# Patient Record
Sex: MALE | Race: BLACK OR AFRICAN AMERICAN | NOT HISPANIC OR LATINO | Employment: UNEMPLOYED | URBAN - METROPOLITAN AREA
[De-identification: names, ages, dates, MRNs, and addresses within clinical notes are randomized per-mention and may not be internally consistent; named-entity substitution may affect disease eponyms.]

---

## 2023-10-05 ENCOUNTER — HOSPITAL ENCOUNTER (EMERGENCY)
Facility: HOSPITAL | Age: 58
Discharge: HOME | End: 2023-10-05
Attending: STUDENT IN AN ORGANIZED HEALTH CARE EDUCATION/TRAINING PROGRAM
Payer: COMMERCIAL

## 2023-10-05 VITALS
HEART RATE: 88 BPM | WEIGHT: 158 LBS | HEIGHT: 69 IN | RESPIRATION RATE: 20 BRPM | DIASTOLIC BLOOD PRESSURE: 80 MMHG | SYSTOLIC BLOOD PRESSURE: 148 MMHG | OXYGEN SATURATION: 98 % | BODY MASS INDEX: 23.4 KG/M2

## 2023-10-05 DIAGNOSIS — J45.901 ASTHMA EXACERBATION: ICD-10-CM

## 2023-10-05 DIAGNOSIS — Z76.0 ENCOUNTER FOR MEDICATION REFILL: Primary | ICD-10-CM

## 2023-10-05 PROCEDURE — 99284 EMERGENCY DEPT VISIT MOD MDM: CPT

## 2023-10-05 PROCEDURE — 99284 EMERGENCY DEPT VISIT MOD MDM: CPT | Performed by: PHYSICIAN ASSISTANT

## 2023-10-05 RX ORDER — IPRATROPIUM BROMIDE AND ALBUTEROL SULFATE .5; 3 MG/3ML; MG/3ML
1 SOLUTION RESPIRATORY (INHALATION) ONCE
Status: COMPLETED | OUTPATIENT
Start: 2023-10-05 | End: 2023-10-05

## 2023-10-05 RX ORDER — ALBUTEROL SULFATE 90 UG/1
2 AEROSOL, METERED RESPIRATORY (INHALATION) ONCE
Status: COMPLETED | OUTPATIENT
Start: 2023-10-05 | End: 2023-10-05

## 2023-10-05 RX ADMIN — ALBUTEROL SULFATE 2 PUFF: 90 AEROSOL, METERED RESPIRATORY (INHALATION) at 20:19

## 2023-10-05 RX ADMIN — Medication 10 MG: at 20:19

## 2023-10-06 NOTE — ED PROVIDER NOTES
History  Chief Complaint   Patient presents with   • Shortness of Breath     Arrived from [address] [place] [address] via EMS. Per EMS, The Facility reported Pt having SOB, administered HHN with relief, Pt did not want to be seen at ER. The facility told Pt to go to ER and to not be D/C from ER without medications- Per Pt. No SOB, no CP, no pain, no coughing or congestion @ present. Patient is a 20-year-old male with a past medical history significant for asthma presenting to the emergency department requesting albuterol inhaler. He is currently at a local rehab facility, however forgot his albuterol inhaler. He asked the rehab facility if he can call his sister to bring his albuterol inhaler, the rehab facility states that since he has asthma he needed to be medically cleared prior to admission to rehab so he was sent to the emergency department. Upon arrival to the emergency department he is completely asymptomatic. He is not having chest pain, shortness of breath, chest tightness, coughing, wheezing. He states that the rehab facility will not allow him to go back without an albuterol inhaler. He states that he does not want to be here and did not wish to come here, however he was forced to by his rehab facility. No other complaints at this time. None       History reviewed. No pertinent past medical history. History reviewed. No pertinent surgical history. History reviewed. No pertinent family history. I have reviewed and agree with the history as documented.     E-Cigarette/Vaping     E-Cigarette/Vaping Substances   • Nicotine No    • THC No    • CBD No    • Flavoring No    • Other No      Social History     Tobacco Use   • Smoking status: Every Day     Packs/day: 0.25     Years: 35.00     Total pack years: 8.75     Types: Cigarettes   • Smokeless tobacco: Never   Substance Use Topics   • Alcohol use: Not Currently   • Drug use: Never       Review of Systems   Constitutional: Negative for chills and fever. HENT: Negative for congestion, rhinorrhea and sore throat. Respiratory: Negative for cough, chest tightness and shortness of breath. Cardiovascular: Negative for chest pain and palpitations. Gastrointestinal: Negative for abdominal pain, diarrhea, nausea and vomiting. All other systems reviewed and are negative. Physical Exam  Physical Exam  Vitals reviewed. Constitutional:       General: He is not in acute distress. Appearance: Normal appearance. He is normal weight. He is not ill-appearing, toxic-appearing or diaphoretic. HENT:      Head: Normocephalic and atraumatic. Right Ear: External ear normal.      Left Ear: External ear normal.   Eyes:      General: No scleral icterus. Right eye: No discharge. Left eye: No discharge. Extraocular Movements: Extraocular movements intact. Conjunctiva/sclera: Conjunctivae normal.   Cardiovascular:      Rate and Rhythm: Normal rate and regular rhythm. Heart sounds: Normal heart sounds. No murmur heard. No friction rub. No gallop. Pulmonary:      Effort: Pulmonary effort is normal. No respiratory distress. Breath sounds: Normal breath sounds. No stridor. No wheezing, rhonchi or rales. Musculoskeletal:      Cervical back: Normal range of motion and neck supple. Skin:     General: Skin is warm and dry. Neurological:      Mental Status: He is alert and oriented to person, place, and time.    Psychiatric:         Mood and Affect: Mood normal.         Behavior: Behavior normal.         Vital Signs  ED Triage Vitals   Temp Pulse Respirations Blood Pressure SpO2   -- 10/05/23 2020 10/05/23 2020 10/05/23 2020 10/05/23 2020    88 20 148/80 98 %      Temp src Heart Rate Source Patient Position - Orthostatic VS BP Location FiO2 (%)   -- 10/05/23 2020 10/05/23 2020 10/05/23 2020 --    Monitor Sitting Right arm       Pain Score       10/05/23 2008       No Pain           Vitals:    10/05/23 2020 BP: 148/80   Pulse: 88   Patient Position - Orthostatic VS: Sitting         Visual Acuity      ED Medications  Medications   ipratropium-albuterol (FOR EMS ONLY) (DUO-NEB) 0.5-2.5 mg/3 mL inhalation solution 3 mL (0 mL Does not apply Given to EMS 10/5/23 2020)   albuterol (PROVENTIL HFA,VENTOLIN HFA) inhaler 2 puff (2 puffs Inhalation Given 10/5/23 2019)   dexamethasone oral liquid 10 mg 1 mL (10 mg Oral Given 10/5/23 2019)       Diagnostic Studies  Results Reviewed     None                 No orders to display              Procedures  Procedures         ED Course                               SBIRT 20yo+    Flowsheet Row Most Recent Value   Initial Alcohol Screen: US AUDIT-C     1. How often do you have a drink containing alcohol? 0 Filed at: 10/05/2023 2012   2. How many drinks containing alcohol do you have on a typical day you are drinking? 0 Filed at: 10/05/2023 2012   3a. Male UNDER 65: How often do you have five or more drinks on one occasion? 0 Filed at: 10/05/2023 2012   Audit-C Score 0 Filed at: 10/05/2023 2012   [place]: How many times in the past year have you. .. Used an illegal drug or used a prescription medication for non-medical reasons? Never Filed at: 10/05/2023 2012                    Medical Decision Making  Patient presenting to the emergency department requesting albuterol inhaler so he can be cleared to go back to his rehab facility. He is asymptomatic. His vital signs are unremarkable. He is maintaining adequate oxygenation. No wheezing or adventitious lung sounds auscultated on examination. He was given dexamethasone and albuterol inhaler. He was discharged with instructions to follow-up with his primary care provider. Strict return precautions were discussed. He is in stable condition at time of discharge. Asthma exacerbation: acute illness or injury  Encounter for medication refill: acute illness or injury  Risk  Prescription drug management.           Disposition  Final diagnoses:   Encounter for medication refill   Asthma exacerbation     Time reflects when diagnosis was documented in both MDM as applicable and the Disposition within this note     Time User Action Codes Description Comment    10/5/2023  8:06 PM Cathy Peterson, [address] [Z76.0] Encounter for medication refill     10/5/2023  8:06 PM Cathy Peterson, [address] Asthma exacerbation       ED Disposition     ED Disposition   Discharge    Condition   Stable    Date/Time   u Oct 5, 2023  8:06 PM    Comment   Lyndon Rodríguez discharge to home/self care. Follow-up Information     Follow up With Specialties Details Why Contact Info Additional [facility] Emergency Department Emergency Medicine Go to  If symptoms worsen [address] [facility] [id number] [address] [facility], [place], [place], [address]          Patient's Medications    No medications on file       No discharge procedures on file.     PDMP Review     None          ED Provider  Electronically Signed by           Edith Contreras PA-C  10/05/23 2037

## 2023-10-06 NOTE — ED NOTES
Call made out to [facility] re: Pt is D/C back to facility with medications. Pt is in waiting room.      Kennon Babinski, RN  10/05/23 2026